# Patient Record
Sex: MALE | Race: WHITE | NOT HISPANIC OR LATINO
[De-identification: names, ages, dates, MRNs, and addresses within clinical notes are randomized per-mention and may not be internally consistent; named-entity substitution may affect disease eponyms.]

---

## 2017-04-12 ENCOUNTER — APPOINTMENT (OUTPATIENT)
Dept: DERMATOLOGY | Facility: CLINIC | Age: 72
End: 2017-04-12

## 2017-04-12 DIAGNOSIS — Z84.0 FAMILY HISTORY OF DISEASES OF THE SKIN AND SUBCUTANEOUS TISSUE: ICD-10-CM

## 2017-04-12 DIAGNOSIS — Z86.39 PERSONAL HISTORY OF OTHER ENDOCRINE, NUTRITIONAL AND METABOLIC DISEASE: ICD-10-CM

## 2017-04-12 DIAGNOSIS — Z87.19 PERSONAL HISTORY OF OTHER DISEASES OF THE DIGESTIVE SYSTEM: ICD-10-CM

## 2017-04-12 DIAGNOSIS — Z00.00 ENCOUNTER FOR GENERAL ADULT MEDICAL EXAMINATION W/OUT ABNORMAL FINDINGS: ICD-10-CM

## 2017-04-12 DIAGNOSIS — Z85.89 PERSONAL HISTORY OF MALIGNANT NEOPLASM OF OTHER ORGANS AND SYSTEMS: ICD-10-CM

## 2017-04-12 DIAGNOSIS — Z87.39 PERSONAL HISTORY OF OTHER DISEASES OF THE MUSCULOSKELETAL SYSTEM AND CONNECTIVE TISSUE: ICD-10-CM

## 2017-04-12 DIAGNOSIS — Z86.79 PERSONAL HISTORY OF OTHER DISEASES OF THE CIRCULATORY SYSTEM: ICD-10-CM

## 2017-04-12 DIAGNOSIS — Z85.820 PERSONAL HISTORY OF MALIGNANT MELANOMA OF SKIN: ICD-10-CM

## 2017-04-12 RX ORDER — AMOXICILLIN 500 MG/1
500 CAPSULE ORAL
Qty: 21 | Refills: 0 | Status: DISCONTINUED | COMMUNITY
Start: 2016-11-08

## 2017-04-12 RX ORDER — TERAZOSIN HCL 10 MG
10 CAPSULE ORAL
Refills: 0 | Status: ACTIVE | COMMUNITY

## 2017-04-12 RX ORDER — ESCITALOPRAM OXALATE 5 MG/1
5 TABLET ORAL
Qty: 30 | Refills: 0 | Status: DISCONTINUED | COMMUNITY
Start: 2017-01-26

## 2017-04-12 RX ORDER — POTASSIUM CHLORIDE 750 MG/1
10 CAPSULE, EXTENDED RELEASE ORAL
Qty: 180 | Refills: 0 | Status: DISCONTINUED | COMMUNITY
Start: 2017-03-02

## 2017-04-12 RX ORDER — ROSUVASTATIN CALCIUM 20 MG/1
20 TABLET, FILM COATED ORAL
Qty: 90 | Refills: 0 | Status: ACTIVE | COMMUNITY
Start: 2016-12-23

## 2017-04-12 RX ORDER — CLOPIDOGREL BISULFATE 75 MG/1
75 TABLET, FILM COATED ORAL
Qty: 90 | Refills: 0 | Status: ACTIVE | COMMUNITY
Start: 2017-02-23

## 2017-04-12 RX ORDER — FINASTERIDE 5 MG/1
5 TABLET, FILM COATED ORAL
Qty: 90 | Refills: 0 | Status: ACTIVE | COMMUNITY
Start: 2016-12-16

## 2017-04-12 RX ORDER — PRAVASTATIN SODIUM 80 MG/1
80 TABLET ORAL
Qty: 90 | Refills: 0 | Status: DISCONTINUED | COMMUNITY
Start: 2016-12-03

## 2017-04-12 RX ORDER — METFORMIN HYDROCHLORIDE 1000 MG/1
1000 TABLET, COATED ORAL
Qty: 180 | Refills: 0 | Status: ACTIVE | COMMUNITY
Start: 2016-12-27

## 2017-04-12 RX ORDER — CICLOPIROX OLAMINE 7.7 MG/G
0.77 CREAM TOPICAL
Qty: 90 | Refills: 0 | Status: ACTIVE | COMMUNITY
Start: 2017-03-15

## 2017-04-12 RX ORDER — ISOSORBIDE MONONITRATE 60 MG/1
60 TABLET, EXTENDED RELEASE ORAL
Qty: 90 | Refills: 0 | Status: ACTIVE | COMMUNITY
Start: 2016-11-07

## 2017-04-12 RX ORDER — ESCITALOPRAM OXALATE 10 MG/1
10 TABLET ORAL
Qty: 90 | Refills: 0 | Status: ACTIVE | COMMUNITY
Start: 2017-03-02

## 2017-08-02 ENCOUNTER — APPOINTMENT (OUTPATIENT)
Dept: GASTROENTEROLOGY | Facility: CLINIC | Age: 72
End: 2017-08-02
Payer: MEDICARE

## 2017-08-02 VITALS
WEIGHT: 239 LBS | DIASTOLIC BLOOD PRESSURE: 68 MMHG | BODY MASS INDEX: 34.22 KG/M2 | TEMPERATURE: 98.1 F | SYSTOLIC BLOOD PRESSURE: 130 MMHG | HEIGHT: 70 IN

## 2017-08-02 DIAGNOSIS — R19.7 DIARRHEA, UNSPECIFIED: ICD-10-CM

## 2017-08-02 DIAGNOSIS — K63.5 POLYP OF COLON: ICD-10-CM

## 2017-08-02 PROCEDURE — 99213 OFFICE O/P EST LOW 20 MIN: CPT

## 2017-08-02 RX ORDER — OXYCODONE AND ACETAMINOPHEN 5; 325 MG/1; MG/1
5-325 TABLET ORAL
Qty: 18 | Refills: 0 | Status: DISCONTINUED | COMMUNITY
Start: 2016-11-08 | End: 2017-08-02

## 2017-08-03 ENCOUNTER — RESULT REVIEW (OUTPATIENT)
Age: 72
End: 2017-08-03

## 2017-08-07 ENCOUNTER — RESULT REVIEW (OUTPATIENT)
Age: 72
End: 2017-08-07

## 2017-08-07 LAB
BUN SERPL-MCNC: 17 MG/DL
CALCIUM SERPL-MCNC: 8.9 MG/DL
CHLORIDE SERPL-SCNC: 101 MMOL/L
CO2 SERPL-SCNC: 24 MMOL/L
CREAT SERPL-MCNC: 0.93 MG/DL
GLUCOSE SERPL-MCNC: 219 MG/DL
MAGNESIUM SERPL-MCNC: 1.3 MG/DL
PHOSPHATE SERPL-MCNC: 3.2 MG/DL
POTASSIUM SERPL-SCNC: 4.5 MMOL/L
SODIUM SERPL-SCNC: 141 MMOL/L

## 2017-09-01 ENCOUNTER — TRANSCRIPTION ENCOUNTER (OUTPATIENT)
Age: 72
End: 2017-09-01

## 2017-09-08 ENCOUNTER — APPOINTMENT (OUTPATIENT)
Dept: GASTROENTEROLOGY | Facility: CLINIC | Age: 72
End: 2017-09-08

## 2018-04-25 ENCOUNTER — APPOINTMENT (OUTPATIENT)
Dept: DERMATOLOGY | Facility: CLINIC | Age: 73
End: 2018-04-25
Payer: MEDICARE

## 2018-04-25 PROCEDURE — 17003 DESTRUCT PREMALG LES 2-14: CPT

## 2018-04-25 PROCEDURE — 17000 DESTRUCT PREMALG LESION: CPT

## 2018-04-25 PROCEDURE — 99213 OFFICE O/P EST LOW 20 MIN: CPT | Mod: 25

## 2018-07-10 ENCOUNTER — APPOINTMENT (OUTPATIENT)
Dept: DERMATOLOGY | Facility: CLINIC | Age: 73
End: 2018-07-10
Payer: MEDICARE

## 2018-07-10 VITALS — HEIGHT: 70 IN | BODY MASS INDEX: 33.93 KG/M2 | WEIGHT: 237 LBS

## 2018-07-10 PROCEDURE — 17000 DESTRUCT PREMALG LESION: CPT

## 2018-07-10 PROCEDURE — 99212 OFFICE O/P EST SF 10 MIN: CPT | Mod: 25

## 2018-07-10 PROCEDURE — 17003 DESTRUCT PREMALG LES 2-14: CPT

## 2018-11-08 ENCOUNTER — APPOINTMENT (OUTPATIENT)
Dept: DERMATOLOGY | Facility: CLINIC | Age: 73
End: 2018-11-08
Payer: MEDICARE

## 2018-11-08 PROCEDURE — 99213 OFFICE O/P EST LOW 20 MIN: CPT | Mod: 25

## 2018-11-08 PROCEDURE — 17000 DESTRUCT PREMALG LESION: CPT

## 2018-11-08 RX ORDER — VALSARTAN 160 MG/1
160 TABLET, COATED ORAL
Qty: 180 | Refills: 0 | Status: DISCONTINUED | COMMUNITY
Start: 2016-11-10 | End: 2018-11-08

## 2018-11-08 RX ORDER — SITAGLIPTIN 25 MG/1
25 TABLET, FILM COATED ORAL
Qty: 180 | Refills: 0 | Status: DISCONTINUED | COMMUNITY
Start: 2018-04-14 | End: 2018-11-08

## 2019-04-21 ENCOUNTER — TRANSCRIPTION ENCOUNTER (OUTPATIENT)
Age: 74
End: 2019-04-21

## 2019-04-24 ENCOUNTER — APPOINTMENT (OUTPATIENT)
Dept: DERMATOLOGY | Facility: CLINIC | Age: 74
End: 2019-04-24
Payer: MEDICARE

## 2019-04-24 PROCEDURE — 11102 TANGNTL BX SKIN SINGLE LES: CPT

## 2019-04-24 PROCEDURE — 99213 OFFICE O/P EST LOW 20 MIN: CPT | Mod: 25

## 2019-04-24 NOTE — PHYSICAL EXAM
[Alert] : alert [Well Nourished] : well nourished [Oriented x 3] : ~L oriented x 3 [Eyelids] : Eyelids [Full Body Skin Exam Performed] : performed [Lips] : Lips [Ears] : Ears [Neck] : Neck [FreeTextEntry3] : A full skin exam was performed including the scalp, face, neck, chest, abdomen, back, buttocks, upper extremities and lower extremities.  The patient declined examination of the genitalia, feet.\par The exam did show the following benign growths:\par [place] pigmented nevi.\par Lentigines.\par Cherry angioma.\par \par Papule with hint crust, right nasal ala.

## 2019-04-24 NOTE — ASSESSMENT
[FreeTextEntry1] : A complete skin examination was performed.  We discussed the importance of photoprotection, including the use of hats, protective clothing and sunscreens with an SPF of at least 30.  Sun avoidance was also discussed.\par \par r/O bCC of the right nasal ala.\par Plan D&C if positive.

## 2019-04-24 NOTE — HISTORY OF PRESENT ILLNESS
[FreeTextEntry1] : Patient presents for skin examination. [de-identified] : Notes persistent lesion of the right nose.  Present for a month, without healing.  Recurrent crusting, but no bleeding.

## 2019-05-07 LAB — CORE LAB BIOPSY: NORMAL

## 2019-05-13 ENCOUNTER — APPOINTMENT (OUTPATIENT)
Dept: DERMATOLOGY | Facility: CLINIC | Age: 74
End: 2019-05-13
Payer: MEDICARE

## 2019-05-13 PROCEDURE — 17282 DSTR MAL LS F/E/E/N/L/M1.1-2: CPT

## 2019-10-07 ENCOUNTER — APPOINTMENT (OUTPATIENT)
Dept: DERMATOLOGY | Facility: CLINIC | Age: 74
End: 2019-10-07
Payer: MEDICARE

## 2019-10-07 DIAGNOSIS — Z00.00 ENCOUNTER FOR GENERAL ADULT MEDICAL EXAMINATION W/OUT ABNORMAL FINDINGS: ICD-10-CM

## 2019-10-07 PROCEDURE — 99213 OFFICE O/P EST LOW 20 MIN: CPT | Mod: 25

## 2019-10-07 PROCEDURE — 11102 TANGNTL BX SKIN SINGLE LES: CPT

## 2019-10-07 RX ORDER — ASPIRIN 325 MG/1
325 TABLET, FILM COATED ORAL
Refills: 0 | Status: DISCONTINUED | COMMUNITY
End: 2019-10-07

## 2019-10-07 RX ORDER — TERAZOSIN 10 MG/1
10 CAPSULE ORAL
Qty: 90 | Refills: 0 | Status: DISCONTINUED | COMMUNITY
Start: 2017-03-06 | End: 2019-10-07

## 2019-10-07 RX ORDER — SITAGLIPTIN 100 MG/1
100 TABLET, FILM COATED ORAL
Refills: 0 | Status: DISCONTINUED | COMMUNITY
End: 2019-10-07

## 2019-10-07 NOTE — PHYSICAL EXAM
[Alert] : alert [Oriented x 3] : ~L oriented x 3 [Well Nourished] : well nourished [Full Body Skin Exam Performed] : performed [FreeTextEntry3] : A full skin exam was performed including the scalp, face (including lips, ears, nose and eyes), neck, chest, abdomen, back, buttocks, upper extremities and lower extremities.  The patient declined examination of the genitalia.  \par The exam revealed the following benign growths:\par [place] pigmented nevi.\par Seborrheic keratoses.\par Lentigines.\par Cherry angioma.\par \par Keratotic papule with crust, superior rim of the left ear.

## 2019-10-07 NOTE — ASSESSMENT
[FreeTextEntry1] : A complete skin examination was performed.  We discussed the importance of photoprotection, including the use of hats, protective clothing and sunscreens with an SPF of at least 30.  Sun avoidance was also discussed.\par \par R/O SCC, superior rim of the left ear.\par Plan excision if positive.

## 2019-10-07 NOTE — HISTORY OF PRESENT ILLNESS
[FreeTextEntry1] : Patient presents for skin examination. [de-identified] : Notes crusting of the left ear for over a month.  Occasional bleeding, but no tenderness.  No self tx.

## 2019-10-15 LAB — CORE LAB BIOPSY: NORMAL

## 2019-11-12 ENCOUNTER — APPOINTMENT (OUTPATIENT)
Dept: DERMATOLOGY | Facility: CLINIC | Age: 74
End: 2019-11-12
Payer: MEDICARE

## 2019-11-12 DIAGNOSIS — C44.219 BASAL CELL CARCINOMA OF SKIN OF LEFT EAR AND EXTERNAL AURICULAR CANAL: ICD-10-CM

## 2019-11-12 DIAGNOSIS — Z85.828 PERSONAL HISTORY OF OTHER MALIGNANT NEOPLASM OF SKIN: ICD-10-CM

## 2019-11-12 PROCEDURE — 17282 DSTR MAL LS F/E/E/N/L/M1.1-2: CPT

## 2020-04-06 ENCOUNTER — APPOINTMENT (OUTPATIENT)
Dept: DERMATOLOGY | Facility: CLINIC | Age: 75
End: 2020-04-06

## 2020-07-18 DIAGNOSIS — Z01.818 ENCOUNTER FOR OTHER PREPROCEDURAL EXAMINATION: ICD-10-CM

## 2020-07-19 ENCOUNTER — APPOINTMENT (OUTPATIENT)
Dept: DISASTER EMERGENCY | Facility: CLINIC | Age: 75
End: 2020-07-19

## 2020-07-20 LAB — SARS-COV-2 N GENE NPH QL NAA+PROBE: NOT DETECTED

## 2020-08-27 ENCOUNTER — APPOINTMENT (OUTPATIENT)
Dept: GASTROENTEROLOGY | Facility: CLINIC | Age: 75
End: 2020-08-27
Payer: MEDICARE

## 2020-08-27 VITALS
WEIGHT: 250 LBS | BODY MASS INDEX: 35.79 KG/M2 | TEMPERATURE: 97.6 F | HEIGHT: 70 IN | SYSTOLIC BLOOD PRESSURE: 140 MMHG | DIASTOLIC BLOOD PRESSURE: 50 MMHG

## 2020-08-27 VITALS — OXYGEN SATURATION: 97 % | HEART RATE: 80 BPM

## 2020-08-27 VITALS — HEIGHT: 69 IN | WEIGHT: 253 LBS | BODY MASS INDEX: 37.47 KG/M2

## 2020-08-27 DIAGNOSIS — D50.0 IRON DEFICIENCY ANEMIA SECONDARY TO BLOOD LOSS (CHRONIC): ICD-10-CM

## 2020-08-27 DIAGNOSIS — E66.9 OBESITY, UNSPECIFIED: ICD-10-CM

## 2020-08-27 DIAGNOSIS — I48.91 UNSPECIFIED ATRIAL FIBRILLATION: ICD-10-CM

## 2020-08-27 DIAGNOSIS — Z95.2 PRESENCE OF PROSTHETIC HEART VALVE: ICD-10-CM

## 2020-08-27 DIAGNOSIS — Z86.010 PERSONAL HISTORY OF COLONIC POLYPS: ICD-10-CM

## 2020-08-27 PROCEDURE — 99203 OFFICE O/P NEW LOW 30 MIN: CPT

## 2020-08-27 RX ORDER — APIXABAN 5 MG/1
5 TABLET, FILM COATED ORAL
Refills: 0 | Status: DISCONTINUED | COMMUNITY
End: 2020-08-27

## 2020-08-27 NOTE — CONSULT LETTER
[Dear  ___] : Dear  [unfilled], [Consult Letter:] : I had the pleasure of evaluating your patient, [unfilled]. [Consult Closing:] : Thank you very much for allowing me to participate in the care of this patient.  If you have any questions, please do not hesitate to contact me. [Please see my note below.] : Please see my note below. [Sincerely,] : Sincerely, [FreeTextEntry2] : Dr. Parminder Felix\par \par Cardiovascular medicine and electrophysiology\par \par [address] [place] [address]\par \par [phone] \par  [FreeTextEntry1] : Please also send us any recent EKG, echocardiogram, exercise stress test or any other relevant cardiac testing as this will be beneficial for the anesthesiologist. [FreeTextEntry3] : Gustavo Blood MD\par

## 2020-08-27 NOTE — REASON FOR VISIT
[Initial Evaluation] : an initial evaluation [FreeTextEntry1] : Deficiency anemia, request for colonoscopy

## 2020-08-27 NOTE — HISTORY OF PRESENT ILLNESS
[de-identified] : Parminder Felix MD takes care of the patient.\par \par Pleasant 75-year-old gentleman who has iron deficiency anemia.\par \par Upper endoscopy done last month by another gastroenterologist apparently was normal.\par \par He needs a colonoscopy.\par \par No colonoscopy donning quite a number of years.\par \par No change in bowel movements.\par \par He has a history of coronary stents and is on Plavix.\par \par He had recent atrial fibrillation and had an ablation and had been on Eliquis.\par \par The Eliquis was stopped a week ago when he was noted to have iron deficiency anemia.\par \par No change in bowel habits.\par \par No blood per rectum.\par \par No family history of intestinal cancers.\par \par He is morbidly obese and was planning on having gastric sleeve surgery and the iron deficiency obviously is holding that up.\par \par Has had some fairly significant leg edema and recently had his furosemide increased to 80 mg a day with some improvement.\par \par The patient also has a porcine heart valve replaced and receives antibiotics prior to procedures

## 2020-08-27 NOTE — PHYSICAL EXAM
[General Appearance - In No Acute Distress] : in no acute distress [General Appearance - Alert] : alert [General Appearance - Well Developed] : well developed [General Appearance - Well Nourished] : well nourished [Sclera] : the sclera and conjunctiva were normal [General Appearance - Well-Appearing] : healthy appearing [Neck Appearance] : the appearance of the neck was normal [Neck Cervical Mass (___cm)] : no neck mass was observed [Jugular Venous Distention Increased] : there was no jugular-venous distention [Auscultation Breath Sounds / Voice Sounds] : lungs were clear to auscultation bilaterally [Apical Impulse] : the apical impulse was normal [Bowel Sounds] : normal bowel sounds [Abdomen Tenderness] : non-tender [Abdomen Soft] : soft [Cervical Lymph Nodes Enlarged Posterior Bilaterally] : posterior cervical [Abdomen Mass (___ Cm)] : no abdominal mass palpated [Cervical Lymph Nodes Enlarged Anterior Bilaterally] : anterior cervical [Supraclavicular Lymph Nodes Enlarged Bilaterally] : supraclavicular [Femoral Lymph Nodes Enlarged Bilaterally] : femoral [Axillary Lymph Nodes Enlarged Bilaterally] : axillary [Inguinal Lymph Nodes Enlarged Bilaterally] : inguinal [FreeTextEntry1] : Significant bilateral leg edema up to the knee [No CVA Tenderness] : no ~M costovertebral angle tenderness [No Spinal Tenderness] : no spinal tenderness [Abnormal Walk] : normal gait [Musculoskeletal - Swelling] : no joint swelling seen [Motor Tone] : muscle strength and tone were normal [Nail Clubbing] : no clubbing  or cyanosis of the fingernails [Skin Color & Pigmentation] : normal skin color and pigmentation [Skin Turgor] : normal skin turgor [] : no rash [No Focal Deficits] : no focal deficits [Oriented To Time, Place, And Person] : oriented to person, place, and time [Impaired Insight] : insight and judgment were intact [Affect] : the affect was normal

## 2020-08-27 NOTE — REVIEW OF SYSTEMS
[Feeling Tired] : feeling tired [Recent Weight Gain (___ Lbs)] : recent [unfilled] ~Ulb weight gain [Negative] : Heme/Lymph

## 2020-08-27 NOTE — HISTORY OF PRESENT ILLNESS
[de-identified] : Parminder Felix MD takes care of the patient.\par \par Pleasant 75-year-old gentleman who has iron deficiency anemia.\par \par Upper endoscopy done last month by another gastroenterologist apparently was normal.\par \par He needs a colonoscopy.\par \par No colonoscopy donning quite a number of years.\par \par No change in bowel movements.\par \par He has a history of coronary stents and is on Plavix.\par \par He had recent atrial fibrillation and had an ablation and had been on Eliquis.\par \par The Eliquis was stopped a week ago when he was noted to have iron deficiency anemia.\par \par No change in bowel habits.\par \par No blood per rectum.\par \par No family history of intestinal cancers.\par \par He is morbidly obese and was planning on having gastric sleeve surgery and the iron deficiency obviously is holding that up.\par \par Has had some fairly significant leg edema and recently had his furosemide increased to 80 mg a day with some improvement.\par \par The patient also has a porcine heart valve replaced and receives antibiotics prior to procedures

## 2020-08-27 NOTE — ASSESSMENT
[FreeTextEntry1] : Impression,\par \par Iron deficiency anemia\par \par Recent upper endoscopy a month ago reported normal\par \par Needs colonoscopy\par \par History of coronary disease with stents on Plavix\par \par History of recent atrial fibrillation with ablation and had been on Eliquis until 1 week ago when it was discontinued because of iron deficiency\par \par Morbid obesity with intention of having gastric sleeve\par \par Bilateral leg edema\par \par Suggest,\par \par Take iron on a regular basis with vitamin C in the morning\par \par Cardiology clearance\par \par Obtain records of any recent cardiac work-up including echocardiogram, EKG, stress testing\par \par Anesthesia clearance\par \par Colonoscopy\par \par Stop Plavix 5 days prior to procedure and take aspirin\par \par IV ampicillin 2 g and gentamicin 120 mg given prosthetic, bovine heart valve\par \par TriLyte\par \par The laxative, or its risks benefits and alternatives have been thoroughly reviewed with the patient in great detail. The laxative instructions have been reviewed in great detail with the patient.\par \par Risks/benefits:\par The procedure, the risks and benefits and alternatives have been reviewed in great detail with the patient.  Risks including, but not limited to sedation such as cardiac and pulmonary compromise, the procedure itself such as bleeding requiring hospitalization, transfusion, surgery, temporary or permanent colostomy.  Perforation or puncture of the requiring hospitalization, surgery, temporary colostomy.\par It has been explained to the patient that though colonoscopy is thought to be the best screening exam for colon cancer and polyps, no screening exam can find all colon polyps or cancers.  \par The patient expresses understanding of the procedure and consents to undergoing the procedure.\par \par

## 2020-08-27 NOTE — REVIEW OF SYSTEMS
[Feeling Tired] : feeling tired [Recent Weight Gain (___ Lbs)] : recent [unfilled] ~Ulb weight gain [Negative] : Endocrine

## 2020-08-27 NOTE — PHYSICAL EXAM
[General Appearance - Alert] : alert [General Appearance - In No Acute Distress] : in no acute distress [General Appearance - Well Developed] : well developed [General Appearance - Well Nourished] : well nourished [General Appearance - Well-Appearing] : healthy appearing [Sclera] : the sclera and conjunctiva were normal [Jugular Venous Distention Increased] : there was no jugular-venous distention [Neck Appearance] : the appearance of the neck was normal [Neck Cervical Mass (___cm)] : no neck mass was observed [Bowel Sounds] : normal bowel sounds [Auscultation Breath Sounds / Voice Sounds] : lungs were clear to auscultation bilaterally [Apical Impulse] : the apical impulse was normal [Abdomen Tenderness] : non-tender [Abdomen Soft] : soft [Abdomen Mass (___ Cm)] : no abdominal mass palpated [Cervical Lymph Nodes Enlarged Posterior Bilaterally] : posterior cervical [Supraclavicular Lymph Nodes Enlarged Bilaterally] : supraclavicular [Cervical Lymph Nodes Enlarged Anterior Bilaterally] : anterior cervical [Femoral Lymph Nodes Enlarged Bilaterally] : femoral [Axillary Lymph Nodes Enlarged Bilaterally] : axillary [FreeTextEntry1] : Significant bilateral leg edema up to the knee [Inguinal Lymph Nodes Enlarged Bilaterally] : inguinal [No CVA Tenderness] : no ~M costovertebral angle tenderness [No Spinal Tenderness] : no spinal tenderness [Abnormal Walk] : normal gait [Nail Clubbing] : no clubbing  or cyanosis of the fingernails [Motor Tone] : muscle strength and tone were normal [Musculoskeletal - Swelling] : no joint swelling seen [Skin Color & Pigmentation] : normal skin color and pigmentation [] : no rash [Skin Turgor] : normal skin turgor [No Focal Deficits] : no focal deficits [Oriented To Time, Place, And Person] : oriented to person, place, and time [Impaired Insight] : insight and judgment were intact [Affect] : the affect was normal

## 2020-08-27 NOTE — CONSULT LETTER
[Consult Letter:] : I had the pleasure of evaluating your patient, [unfilled]. [Dear  ___] : Dear  [unfilled], [Consult Closing:] : Thank you very much for allowing me to participate in the care of this patient.  If you have any questions, please do not hesitate to contact me. [Sincerely,] : Sincerely, [Please see my note below.] : Please see my note below. [FreeTextEntry2] : Dr. Parminder Felix\par \par Cardiovascular medicine and electrophysiology\par \par [address] [place] [address]\par \par [phone] \par  [FreeTextEntry1] : Please also send us any recent EKG, echocardiogram, exercise stress test or any other relevant cardiac testing as this will be beneficial for the anesthesiologist. [FreeTextEntry3] : Gustavo Blood MD\par

## 2020-08-31 DIAGNOSIS — Z11.59 ENCOUNTER FOR SCREENING FOR OTHER VIRAL DISEASES: ICD-10-CM

## 2020-08-31 DIAGNOSIS — Z01.818 ENCOUNTER FOR OTHER PREPROCEDURAL EXAMINATION: ICD-10-CM

## 2020-09-01 ENCOUNTER — APPOINTMENT (OUTPATIENT)
Dept: GASTROENTEROLOGY | Facility: AMBULATORY MEDICAL SERVICES | Age: 75
End: 2020-09-01

## 2020-09-05 ENCOUNTER — APPOINTMENT (OUTPATIENT)
Dept: DISASTER EMERGENCY | Facility: CLINIC | Age: 75
End: 2020-09-05

## 2020-09-06 LAB — SARS-COV-2 N GENE NPH QL NAA+PROBE: NOT DETECTED

## 2020-09-07 ENCOUNTER — TRANSCRIPTION ENCOUNTER (OUTPATIENT)
Age: 75
End: 2020-09-07

## 2020-09-08 ENCOUNTER — OUTPATIENT (OUTPATIENT)
Dept: OUTPATIENT SERVICES | Facility: HOSPITAL | Age: 75
LOS: 1 days | End: 2020-09-08
Payer: MEDICARE

## 2020-09-08 ENCOUNTER — APPOINTMENT (OUTPATIENT)
Dept: GASTROENTEROLOGY | Facility: HOSPITAL | Age: 75
End: 2020-09-08
Payer: MEDICARE

## 2020-09-08 ENCOUNTER — RESULT REVIEW (OUTPATIENT)
Age: 75
End: 2020-09-08

## 2020-09-08 DIAGNOSIS — K29.00 ACUTE GASTRITIS W/OUT BLEEDING: ICD-10-CM

## 2020-09-08 DIAGNOSIS — K63.5 POLYP OF COLON: ICD-10-CM

## 2020-09-08 DIAGNOSIS — D50.0 IRON DEFICIENCY ANEMIA SECONDARY TO BLOOD LOSS (CHRONIC): ICD-10-CM

## 2020-09-08 DIAGNOSIS — Z86.010 PERSONAL HISTORY OF COLONIC POLYPS: ICD-10-CM

## 2020-09-08 PROCEDURE — 45380 COLONOSCOPY AND BIOPSY: CPT

## 2020-09-08 PROCEDURE — 88305 TISSUE EXAM BY PATHOLOGIST: CPT | Mod: 26

## 2020-09-08 PROCEDURE — 88312 SPECIAL STAINS GROUP 1: CPT | Mod: 26

## 2020-09-08 PROCEDURE — 43239 EGD BIOPSY SINGLE/MULTIPLE: CPT

## 2020-09-08 PROCEDURE — 82962 GLUCOSE BLOOD TEST: CPT

## 2020-09-08 PROCEDURE — 88305 TISSUE EXAM BY PATHOLOGIST: CPT

## 2020-09-08 PROCEDURE — 88313 SPECIAL STAINS GROUP 2: CPT

## 2020-09-08 PROCEDURE — 88312 SPECIAL STAINS GROUP 1: CPT

## 2020-09-08 PROCEDURE — 88313 SPECIAL STAINS GROUP 2: CPT | Mod: 26

## 2020-09-08 RX ORDER — METOCLOPRAMIDE 10 MG/1
10 TABLET ORAL
Qty: 1 | Refills: 0 | Status: ACTIVE | COMMUNITY
Start: 2020-09-08 | End: 1900-01-01

## 2020-09-09 LAB — SURGICAL PATHOLOGY STUDY: SIGNIFICANT CHANGE UP

## 2020-09-15 RX ORDER — METOCLOPRAMIDE 10 MG/1
10 TABLET ORAL
Qty: 1 | Refills: 0 | Status: ACTIVE | COMMUNITY
Start: 2020-09-15 | End: 1900-01-01

## 2020-10-14 ENCOUNTER — APPOINTMENT (OUTPATIENT)
Dept: GASTROENTEROLOGY | Facility: CLINIC | Age: 75
End: 2020-10-14
Payer: MEDICARE

## 2020-10-14 PROCEDURE — 91110 GI TRC IMG INTRAL ESOPH-ILE: CPT

## 2020-10-20 ENCOUNTER — APPOINTMENT (OUTPATIENT)
Dept: DERMATOLOGY | Facility: CLINIC | Age: 75
End: 2020-10-20
Payer: MEDICARE

## 2020-10-20 VITALS — BODY MASS INDEX: 35.55 KG/M2 | WEIGHT: 240 LBS | HEIGHT: 69 IN

## 2020-10-20 DIAGNOSIS — D22.9 MELANOCYTIC NEVI, UNSPECIFIED: ICD-10-CM

## 2020-10-20 DIAGNOSIS — B35.3 TINEA PEDIS: ICD-10-CM

## 2020-10-20 PROCEDURE — 17000 DESTRUCT PREMALG LESION: CPT

## 2020-10-20 PROCEDURE — 99213 OFFICE O/P EST LOW 20 MIN: CPT | Mod: 25

## 2020-10-20 PROCEDURE — 17003 DESTRUCT PREMALG LES 2-14: CPT

## 2020-10-20 NOTE — ASSESSMENT
[FreeTextEntry1] : A complete skin examination was performed.  There is no evidence of skin cancer.  We discussed the importance of photoprotection, including the use of hats, protective clothing and sunscreens with an SPF of at least 30.  Sun avoidance was also discussed.  The ABCDE's of melanoma was discussed.  Regular skin exams recommended.\par \par Stasis derm\par TAC 0.1% cream, prn.\par AmLactin lotion daily.\par \par Tinea pedis\par Spectazole cream bid for 6 weeks, then biw.

## 2020-10-20 NOTE — HISTORY OF PRESENT ILLNESS
[FreeTextEntry1] : Patient presents for skin examination. [de-identified] : Notes itching of the feet, exacerbating/remitting for many years.  No current tx, other than hytone cream on the dorsal feet at night.

## 2020-10-20 NOTE — PHYSICAL EXAM
[Alert] : alert [Oriented x 3] : ~L oriented x 3 [Full Body Skin Exam Performed] : performed [Well Nourished] : well nourished [FreeTextEntry3] : A full skin exam was performed including the scalp, face (including lips, ears, nose and eyes), neck, chest, abdomen, back, buttocks, upper extremities and lower extremities.  The patient declined examination of the genitalia.  \par The exam revealed the following benign growths:\par [place] pigmented nevi.\par Seborrheic keratoses.\par Lentigines.\par Cherry angioma.\par \par Keratotic papules, right ear, left temple, left ear, left malar and left mandible, left dorsal hand x 3.\par \par 1+ pitting edema of the legs, with erythema of the bilateral shins.\par \par Erythema, scale of the distal dorsal feet, with macerated scale, with odor, of the toewebs.

## 2020-12-03 ENCOUNTER — RX RENEWAL (OUTPATIENT)
Age: 75
End: 2020-12-03

## 2020-12-03 ENCOUNTER — NON-APPOINTMENT (OUTPATIENT)
Age: 75
End: 2020-12-03

## 2020-12-18 ENCOUNTER — APPOINTMENT (OUTPATIENT)
Dept: DISASTER EMERGENCY | Facility: CLINIC | Age: 75
End: 2020-12-18

## 2020-12-19 ENCOUNTER — APPOINTMENT (OUTPATIENT)
Dept: DISASTER EMERGENCY | Facility: CLINIC | Age: 75
End: 2020-12-19

## 2020-12-19 LAB — SARS-COV-2 N GENE NPH QL NAA+PROBE: NOT DETECTED

## 2020-12-21 ENCOUNTER — RESULT REVIEW (OUTPATIENT)
Age: 75
End: 2020-12-21

## 2020-12-24 ENCOUNTER — NON-APPOINTMENT (OUTPATIENT)
Age: 75
End: 2020-12-24

## 2021-02-09 ENCOUNTER — NON-APPOINTMENT (OUTPATIENT)
Age: 76
End: 2021-02-09

## 2021-04-14 ENCOUNTER — APPOINTMENT (OUTPATIENT)
Dept: DERMATOLOGY | Facility: CLINIC | Age: 76
End: 2021-04-14
Payer: MEDICARE

## 2021-04-14 VITALS — BODY MASS INDEX: 30.36 KG/M2 | WEIGHT: 205 LBS | HEIGHT: 69 IN

## 2021-04-14 DIAGNOSIS — L57.0 ACTINIC KERATOSIS: ICD-10-CM

## 2021-04-14 PROCEDURE — 17000 DESTRUCT PREMALG LESION: CPT

## 2021-04-14 PROCEDURE — 17003 DESTRUCT PREMALG LES 2-14: CPT

## 2021-04-14 PROCEDURE — 99213 OFFICE O/P EST LOW 20 MIN: CPT | Mod: 25

## 2021-04-14 RX ORDER — ALLOPURINOL 100 MG/1
100 TABLET ORAL
Refills: 0 | Status: ACTIVE | COMMUNITY

## 2021-04-14 RX ORDER — CARVEDILOL 25 MG/1
25 TABLET, FILM COATED ORAL
Qty: 180 | Refills: 0 | Status: DISCONTINUED | COMMUNITY
Start: 2017-01-03 | End: 2021-04-14

## 2021-04-14 RX ORDER — POLYETHYLENE GLYOCOL 3350, SODIUM CHLORIDE, SODIUM BICARBONATE AND POTASSIUM CHLORIDE 420; 11.2; 5.72; 1.48 G/4L; G/4L; G/4L; G/4L
420 POWDER, FOR SOLUTION NASOGASTRIC; ORAL
Qty: 1 | Refills: 0 | Status: DISCONTINUED | COMMUNITY
Start: 2020-08-27 | End: 2021-04-14

## 2021-04-14 RX ORDER — PANTOPRAZOLE 40 MG/1
40 TABLET, DELAYED RELEASE ORAL
Qty: 90 | Refills: 0 | Status: DISCONTINUED | COMMUNITY
Start: 2016-10-17 | End: 2021-04-14

## 2021-04-14 RX ORDER — SEMAGLUTIDE 0.68 MG/ML
INJECTION, SOLUTION SUBCUTANEOUS
Refills: 0 | Status: DISCONTINUED | COMMUNITY
End: 2021-04-14

## 2021-04-14 RX ORDER — ALLOPURINOL 300 MG/1
300 TABLET ORAL
Qty: 180 | Refills: 0 | Status: DISCONTINUED | COMMUNITY
Start: 2017-02-23 | End: 2021-04-14

## 2021-04-14 RX ORDER — CARVEDILOL 12.5 MG/1
12.5 TABLET, FILM COATED ORAL
Refills: 0 | Status: ACTIVE | COMMUNITY

## 2021-04-14 RX ORDER — CANDESARTAN CILEXETIL 16 MG/1
16 TABLET ORAL
Refills: 0 | Status: DISCONTINUED | COMMUNITY
End: 2021-04-14

## 2021-04-14 NOTE — PHYSICAL EXAM
[Alert] : alert [Oriented x 3] : ~L oriented x 3 [Well Nourished] : well nourished [Full Body Skin Exam Performed] : performed [FreeTextEntry3] : A full skin exam was performed including the scalp, face (including lips, ears, nose and eyes), neck, chest, abdomen, back, buttocks, upper extremities and lower extremities.  The patient declined examination of the genitalia.  \par The exam revealed the following benign growths:\par [place] pigmented nevi.\par Seborrheic keratoses.\par Lentigines.\par Cherry angioma.\par \par Keratotic papules of the vertex and right dorsal wrist.

## 2021-04-14 NOTE — HISTORY OF PRESENT ILLNESS
[FreeTextEntry1] : Patient presents for skin examination. [de-identified] : Notes rough lesion of the vertex of the scalp.  No bleeding or tenderness.

## 2021-08-31 ENCOUNTER — RX RENEWAL (OUTPATIENT)
Age: 76
End: 2021-08-31

## 2021-08-31 ENCOUNTER — NON-APPOINTMENT (OUTPATIENT)
Age: 76
End: 2021-08-31

## 2021-08-31 RX ORDER — FAMOTIDINE 40 MG/1
40 TABLET, FILM COATED ORAL
Qty: 90 | Refills: 0 | Status: ACTIVE | COMMUNITY
Start: 2020-09-08 | End: 1900-01-01

## 2021-10-13 ENCOUNTER — APPOINTMENT (OUTPATIENT)
Dept: DERMATOLOGY | Facility: CLINIC | Age: 76
End: 2021-10-13
Payer: MEDICARE

## 2021-10-13 PROCEDURE — 99213 OFFICE O/P EST LOW 20 MIN: CPT

## 2021-10-13 NOTE — HISTORY OF PRESENT ILLNESS
[FreeTextEntry1] : Patient presents for skin examination. [de-identified] : Notes irritation of the right knee.  No bleeding, no self tx.

## 2021-10-13 NOTE — PHYSICAL EXAM
[Oriented x 3] : ~L oriented x 3 [Alert] : alert [Well Nourished] : well nourished [Full Body Skin Exam Performed] : performed [FreeTextEntry3] : A full skin exam was performed including the scalp, face (including lips, ears, nose and eyes), neck, chest, abdomen, back, buttocks, upper extremities and lower extremities.  The patient declined examination of the genitalia.  \par The exam revealed the following benign growths:\par [place] pigmented nevi.\par Seborrheic keratoses.\par Lentigines.\par Cherry angioma.\par \par erythema, scale, with tense edema of the bilateral shins, with a patch of the right inferior knee region.

## 2021-10-13 NOTE — ASSESSMENT
[FreeTextEntry1] : A complete skin examination was performed.  There is no evidence of skin cancer.  We discussed the importance of photoprotection, including the use of hats, protective clothing and sunscreens with an SPF of at least 30.  Sun avoidance was also discussed.  The ABCDE's of melanoma was discussed.  Regular skin exams recommended.\par \par Stasis dermatitis\par Patient has TAC cream at home - use bid.\par Emollients - AmLactin lotion daily.\par

## 2022-04-13 ENCOUNTER — APPOINTMENT (OUTPATIENT)
Dept: DERMATOLOGY | Facility: CLINIC | Age: 77
End: 2022-04-13
Payer: MEDICARE

## 2022-04-13 PROCEDURE — 11102 TANGNTL BX SKIN SINGLE LES: CPT

## 2022-04-13 PROCEDURE — 99213 OFFICE O/P EST LOW 20 MIN: CPT | Mod: 25

## 2022-04-13 NOTE — HISTORY OF PRESENT ILLNESS
[FreeTextEntry1] : Patient presents for skin examination. [de-identified] : Notes right nose lesion, with recurrent crust.  No bleeding.

## 2022-04-13 NOTE — ASSESSMENT
[FreeTextEntry1] : A complete skin examination was performed.  We discussed the importance of photoprotection, including the use of hats, protective clothing and sunscreens with an SPF of at least 30.  Sun avoidance was also discussed.  The ABCDE's of melanoma was discussed with the patient.  Regular skin exams are encouraged.\par \par R/O BCC of the right nasal alar crease.\par Plan D&C if positive.

## 2022-04-13 NOTE — PHYSICAL EXAM
[Alert] : alert [Oriented x 3] : ~L oriented x 3 [Well Nourished] : well nourished [Full Body Skin Exam Performed] : performed [FreeTextEntry3] : A full skin exam was performed including the scalp, face (including lips, ears, nose and eyes), neck, chest, abdomen, back, buttocks, upper extremities and lower extremities.  The patient declined examination of the genitalia.  \par The exam revealed the following benign growths:\par [place] pigmented nevi.\par Seborrheic keratoses.\par Lentigines.\par Cherry angioma.\par \par Erythematous macule, with mild crust, erythema, right nasal alar crease.

## 2022-04-26 LAB — CORE LAB BIOPSY: NORMAL

## 2022-06-09 ENCOUNTER — APPOINTMENT (OUTPATIENT)
Dept: DERMATOLOGY | Facility: CLINIC | Age: 77
End: 2022-06-09

## 2022-06-16 ENCOUNTER — APPOINTMENT (OUTPATIENT)
Dept: DERMATOLOGY | Facility: CLINIC | Age: 77
End: 2022-06-16
Payer: MEDICARE

## 2022-06-16 DIAGNOSIS — D04.30 CARCINOMA IN SITU OF SKIN OF UNSPECIFIED PART OF FACE: ICD-10-CM

## 2022-06-16 PROCEDURE — 17282 DSTR MAL LS F/E/E/N/L/M1.1-2: CPT

## 2022-06-16 RX ORDER — TRIAMCINOLONE ACETONIDE 1 MG/G
0.1 CREAM TOPICAL
Qty: 1 | Refills: 3 | Status: DISCONTINUED | COMMUNITY
Start: 2020-10-20 | End: 2022-06-16

## 2022-06-16 RX ORDER — ECONAZOLE NITRATE 10 MG/G
1 CREAM TOPICAL
Qty: 1 | Refills: 2 | Status: DISCONTINUED | COMMUNITY
Start: 2020-10-20 | End: 2022-06-16

## 2022-06-22 ENCOUNTER — APPOINTMENT (OUTPATIENT)
Dept: ORTHOPEDIC SURGERY | Facility: CLINIC | Age: 77
End: 2022-06-22
Payer: MEDICARE

## 2022-06-22 VITALS — HEIGHT: 69 IN | WEIGHT: 205 LBS | BODY MASS INDEX: 30.36 KG/M2

## 2022-06-22 DIAGNOSIS — M79.18 MYALGIA, OTHER SITE: ICD-10-CM

## 2022-06-22 DIAGNOSIS — S42.292A OTHER DISPLACED FRACTURE OF UPPER END OF LEFT HUMERUS, INITIAL ENCOUNTER FOR CLOSED FRACTURE: ICD-10-CM

## 2022-06-22 PROCEDURE — 23600 CLTX PROX HUMRL FX W/O MNPJ: CPT

## 2022-06-22 PROCEDURE — 73030 X-RAY EXAM OF SHOULDER: CPT | Mod: LT

## 2022-06-22 PROCEDURE — L3670: CPT

## 2022-06-22 PROCEDURE — 73010 X-RAY EXAM OF SHOULDER BLADE: CPT | Mod: LT

## 2022-06-22 PROCEDURE — 99204 OFFICE O/P NEW MOD 45 MIN: CPT | Mod: 25

## 2022-06-22 NOTE — HISTORY OF PRESENT ILLNESS
[de-identified] : 77 year old male  (RHD, retired ) left shoulder pain since 6/19/22 after fell pulling a weed out of lawn and fall on the driveway went to [facility] \par The pain is located  anterior,  lateral \par The pain is associated with weakness, swelling \par Worse with activity and better at rest.\par Has tried ice, NSAIDs \par

## 2022-06-22 NOTE — ASSESSMENT
[FreeTextEntry1] : outside xray nondisplacec prox hum fx\par new xrays no displackemtn\par \par left prox humers fx\par \par \par - The patient was advised of the diagnosis.  The natural history of the pathology was explained to the patient in layman's terms.  Several different treatment options were discussed and explained including the risks and benefits of both surgical and non-surgical treatments.  All questions and concerns were answered.\par - The patient was advised to modify their activities.\par - nonop fx care in sling\par - nwb\par - prom\par - fu 8 weesk xray for healing

## 2022-06-22 NOTE — IMAGING
[de-identified] : \par LEFT SHOULDER\par Inspection: No swelling. \par Palpation: Tenderness is noted at the bicipital groove, anterior and lateral. \par Range of motion: There is pain with range of motion.\par , ER 55, @90ER 90, @90IR 30\par Strength: There is pain and discomfort with strength testing.\par Forward Flexion 4/5. Abduction 4/5.  External Rotation 5-/5 and Internal Rotation 5/5 \par Neurological testings: motor and sensor intact distally.\par Ligament Stability and Special Tests: \par There is positive arc of pain. \par Shoulder apprehension: neg\par Shoulder relocation: neg\par Thomas’s test: pos\par Biceps Active test: neg\par Aldrich Labral Shear: neg\par Impingement testing: pos\par Marika testing: pos\par Whipple: pos\par Cross Body Adduction: neg\par \par  [Left] : left shoulder [Fracture] : Fracture

## 2022-06-22 NOTE — DATA REVIEWED
[Outside X-rays] : outside x-rays [Left] : left [Shoulder] : shoulder [I independently reviewed and interpreted images and report] : I independently reviewed and interpreted images and report

## 2022-08-10 ENCOUNTER — APPOINTMENT (OUTPATIENT)
Dept: ORTHOPEDIC SURGERY | Facility: CLINIC | Age: 77
End: 2022-08-10

## 2022-10-24 ENCOUNTER — APPOINTMENT (OUTPATIENT)
Dept: DERMATOLOGY | Facility: CLINIC | Age: 77
End: 2022-10-24

## 2022-10-24 DIAGNOSIS — D48.5 NEOPLASM OF UNCERTAIN BEHAVIOR OF SKIN: ICD-10-CM

## 2022-10-24 PROCEDURE — 99213 OFFICE O/P EST LOW 20 MIN: CPT | Mod: 25

## 2022-10-24 PROCEDURE — 11102 TANGNTL BX SKIN SINGLE LES: CPT

## 2022-10-24 RX ORDER — CEFUROXIME AXETIL 500 MG/1
500 TABLET ORAL
Qty: 14 | Refills: 0 | Status: COMPLETED | COMMUNITY
Start: 2022-07-19

## 2022-10-24 RX ORDER — ALFUZOSIN HYDROCHLORIDE 10 MG/1
10 TABLET, EXTENDED RELEASE ORAL
Qty: 90 | Refills: 0 | Status: ACTIVE | COMMUNITY
Start: 2022-08-16

## 2022-10-24 RX ORDER — CIPROFLOXACIN HYDROCHLORIDE 500 MG/1
500 TABLET, FILM COATED ORAL
Qty: 14 | Refills: 0 | Status: COMPLETED | COMMUNITY
Start: 2022-07-29

## 2022-10-24 RX ORDER — CARVEDILOL 6.25 MG/1
6.25 TABLET, FILM COATED ORAL
Qty: 180 | Refills: 0 | Status: ACTIVE | COMMUNITY
Start: 2022-08-04

## 2022-10-24 RX ORDER — NIRMATRELVIR AND RITONAVIR 300-100 MG
20 X 150 MG & KIT ORAL
Qty: 30 | Refills: 0 | Status: COMPLETED | COMMUNITY
Start: 2022-08-14

## 2022-10-24 RX ORDER — COVID-19 ANTIGEN TEST
KIT MISCELLANEOUS
Qty: 8 | Refills: 0 | Status: ACTIVE | COMMUNITY
Start: 2022-08-11

## 2022-10-24 RX ORDER — METFORMIN HYDROCHLORIDE 500 MG/1
500 TABLET, COATED ORAL
Qty: 180 | Refills: 0 | Status: ACTIVE | COMMUNITY
Start: 2022-09-19

## 2022-10-24 NOTE — HISTORY OF PRESENT ILLNESS
[FreeTextEntry1] : Patient presents for skin examination. [de-identified] : Notes redness and scaling of the LE's.

## 2022-10-24 NOTE — ASSESSMENT
[FreeTextEntry1] : A complete skin examination was performed.  We discussed the importance of photoprotection, including the use of hats, protective clothing and sunscreens with an SPF of at least 30.  Sun avoidance was also discussed.  The ABCDE's of melanoma was discussed with the patient.  Regular skin exams are encouraged.\par \par R/O BCC's of the midforehead and right vertex of scalp.\par Plan D&C's if positive.

## 2022-10-24 NOTE — PHYSICAL EXAM
[Alert] : alert [Oriented x 3] : ~L oriented x 3 [Well Nourished] : well nourished [Full Body Skin Exam Performed] : performed [FreeTextEntry3] : A full skin exam was performed including the scalp, face (including lips, ears, nose and eyes), neck, chest, abdomen, back, buttocks, upper extremities and lower extremities.  The patient declined examination of the genitalia.  \par The exam revealed the following benign growths:\par [place] pigmented nevi.\par Seborrheic keratoses.\par Lentigines.\par Cherry angioma.\par \par Erythematous macules, hint fine telangiectasias, of the mid-forehead and right vertex.

## 2023-01-23 ENCOUNTER — APPOINTMENT (OUTPATIENT)
Dept: DERMATOLOGY | Facility: CLINIC | Age: 78
End: 2023-01-23

## 2023-05-10 ENCOUNTER — APPOINTMENT (OUTPATIENT)
Dept: DERMATOLOGY | Facility: CLINIC | Age: 78
End: 2023-05-10
Payer: MEDICARE

## 2023-05-10 DIAGNOSIS — L82.1 OTHER SEBORRHEIC KERATOSIS: ICD-10-CM

## 2023-05-10 DIAGNOSIS — D22.9 MELANOCYTIC NEVI, UNSPECIFIED: ICD-10-CM

## 2023-05-10 DIAGNOSIS — L81.4 OTHER MELANIN HYPERPIGMENTATION: ICD-10-CM

## 2023-05-10 DIAGNOSIS — D18.01 HEMANGIOMA OF SKIN AND SUBCUTANEOUS TISSUE: ICD-10-CM

## 2023-05-10 DIAGNOSIS — I87.2 VENOUS INSUFFICIENCY (CHRONIC) (PERIPHERAL): ICD-10-CM

## 2023-05-10 PROCEDURE — 99213 OFFICE O/P EST LOW 20 MIN: CPT

## 2023-05-10 NOTE — HISTORY OF PRESENT ILLNESS
[FreeTextEntry1] : Patient presents for skin examination. [de-identified] : Denies new, changing, bleeding or tender lesions on the skin over the past 6 months.\par

## 2023-05-10 NOTE — ASSESSMENT
[FreeTextEntry1] : A complete skin examination was performed.  There is no evidence of skin cancer.  We discussed the importance of photoprotection, including the use of hats, protective clothing and sunscreens with an SPF of at least 30.  Sun avoidance was also discussed.  The ABCDE's of melanoma was discussed.  Regular skin exams recommended.\par \par Stasis derm\par Compression discussed.\par Education.\par Follow.

## 2023-05-10 NOTE — PHYSICAL EXAM
[Alert] : alert [Oriented x 3] : ~L oriented x 3 [Well Nourished] : well nourished [Full Body Skin Exam Performed] : performed [FreeTextEntry3] : A full skin exam was performed including the scalp, face (including lips, ears, nose and eyes), neck, chest, abdomen, back, buttocks, upper extremities and lower extremities.  The patient declined examination of the genitalia.  \par The exam revealed the following benign growths:\par [place] pigmented nevi.\par Seborrheic keratoses.\par Lentigines.\par Cherry angioma.\par \par 1+ pitting edema with erythema of the bilateral shins.

## 2023-11-22 ENCOUNTER — APPOINTMENT (OUTPATIENT)
Dept: DERMATOLOGY | Facility: CLINIC | Age: 78
End: 2023-11-22

## 2024-10-07 ENCOUNTER — APPOINTMENT (OUTPATIENT)
Dept: DERMATOLOGY | Facility: CLINIC | Age: 79
End: 2024-10-07
Payer: MEDICARE

## 2024-10-07 DIAGNOSIS — L82.1 OTHER SEBORRHEIC KERATOSIS: ICD-10-CM

## 2024-10-07 DIAGNOSIS — D48.5 NEOPLASM OF UNCERTAIN BEHAVIOR OF SKIN: ICD-10-CM

## 2024-10-07 DIAGNOSIS — I87.2 VENOUS INSUFFICIENCY (CHRONIC) (PERIPHERAL): ICD-10-CM

## 2024-10-07 DIAGNOSIS — Z86.018 PERSONAL HISTORY OF OTHER BENIGN NEOPLASM: ICD-10-CM

## 2024-10-07 DIAGNOSIS — D18.01 HEMANGIOMA OF SKIN AND SUBCUTANEOUS TISSUE: ICD-10-CM

## 2024-10-07 DIAGNOSIS — L81.4 OTHER MELANIN HYPERPIGMENTATION: ICD-10-CM

## 2024-10-07 DIAGNOSIS — D22.9 MELANOCYTIC NEVI, UNSPECIFIED: ICD-10-CM

## 2024-10-07 PROCEDURE — 11102 TANGNTL BX SKIN SINGLE LES: CPT

## 2024-10-07 PROCEDURE — 99213 OFFICE O/P EST LOW 20 MIN: CPT | Mod: 25

## 2024-10-11 LAB — CORE LAB BIOPSY: NORMAL

## 2024-10-31 ENCOUNTER — APPOINTMENT (OUTPATIENT)
Dept: DERMATOLOGY | Facility: CLINIC | Age: 79
End: 2024-10-31
Payer: MEDICARE

## 2024-10-31 DIAGNOSIS — C44.310 BASAL CELL CARCINOMA OF SKIN OF UNSPECIFIED PARTS OF FACE: ICD-10-CM

## 2024-10-31 DIAGNOSIS — L21.9 SEBORRHEIC DERMATITIS, UNSPECIFIED: ICD-10-CM

## 2024-10-31 PROCEDURE — 99213 OFFICE O/P EST LOW 20 MIN: CPT | Mod: 25

## 2024-10-31 PROCEDURE — 17282 DSTR MAL LS F/E/E/N/L/M1.1-2: CPT

## 2024-10-31 RX ORDER — HYDROCORTISONE 25 MG/G
2.5 CREAM TOPICAL TWICE DAILY
Qty: 15 | Refills: 2 | Status: ACTIVE | COMMUNITY
Start: 2024-10-31 | End: 1900-01-01

## 2025-02-24 ENCOUNTER — APPOINTMENT (OUTPATIENT)
Dept: DERMATOLOGY | Facility: CLINIC | Age: 80
End: 2025-02-24
Payer: MEDICARE

## 2025-02-24 DIAGNOSIS — B02.8 ZOSTER WITH OTHER COMPLICATIONS: ICD-10-CM

## 2025-02-24 PROCEDURE — 99213 OFFICE O/P EST LOW 20 MIN: CPT

## 2025-02-24 RX ORDER — TAMSULOSIN HYDROCHLORIDE 0.4 MG/1
CAPSULE ORAL
Refills: 0 | Status: ACTIVE | COMMUNITY

## 2025-02-24 RX ORDER — APIXABAN 5 MG/1
TABLET, FILM COATED ORAL
Refills: 0 | Status: ACTIVE | COMMUNITY

## 2025-02-24 RX ORDER — CARVEDILOL 25 MG/1
25 TABLET, FILM COATED ORAL
Refills: 0 | Status: ACTIVE | COMMUNITY